# Patient Record
(demographics unavailable — no encounter records)

---

## 2024-10-17 NOTE — ASSESSMENT
[FreeTextEntry1] : Left X-Ray Examination of the HAND (3 views): nondisplaced fracture of 1st MC bone   - The patient was advised of the diagnosis.  The natural history of the pathology was explained to the patient in layman's terms.  Several different treatment options were discussed and explained including the risks and benefits of both surgical and non-surgical treatments.   Surgical risks include but are not limited to pain, infection, bleeding, vascular injury, numbness, tingling, nerve damage. - Due to risks of surgery, they will continue conservative treatment  - nonop fx care in thumb brace - The patient was advised to apply ice (wrapped in a towel or protective covering) to the area daily (20 minutes at a time, 2-4X/day). - The patient was advised to modify their activities. - can cheer but no stunting - fu after 4-5 weeks xray for healing    Medication Discussion: 1) We discussed a comprehensive treatment plan that included possible pharmaceutical management involving the use of prescription strength medications including but not limited to options such as oral Naprosyn 500mg BID, once daily Meloxicam 15 mg, or 500-650 mg Tylenol versus over the counter oral medications in addition to discussing possible topical prescription Pennsaid vs  Voltaren gel. 2) There is a moderate risk of morbidity with further treatment, especially from use of prescription strength medications and possible side effects of these medications which include but are not limited to upset stomach with oral medications, skin reactions to topical medications and GI/cardiac/renal issues with long term use. 3) I recommended that the patient follow-up with their medical physician if there are any significant potential issues with long term medication use such as interactions with current medications or with exacerbation of underlying medical comorbidities. 4) The benefits and risks associated with use of oral and / or topical prescription and over the counter anti-inflammatory medications were discussed with the patient. The patient voiced understanding of the risks including but not limited to bleeding, stroke, kidney dysfunction, heart disease, and were referred to the black box warning label for further information.

## 2024-10-17 NOTE — HISTORY OF PRESENT ILLNESS
[de-identified] : 12 year old female  ( RHD, [employer], [employer] in cheer) Left thumb injury since 10/16/24 when went to try and cacth the flyer when thumb got bent back.  The pain is associated with  swelling Worse with activity and better at rest. Has tried ice, bandage

## 2024-10-17 NOTE — IMAGING
[de-identified] : RIGHT  WRIST and HAND Inspection: swelling thumb Palpation: Tenderness  thumb Range of Motion: pain with ROM Strength: normal Neurological testing: motor exam 5/5 and light touch intact.  Special Testing: pain with v/v/ stress

## 2024-11-20 NOTE — ASSESSMENT
[FreeTextEntry1] :  Left X-Ray Examination of the HAND (3 views): healed fracture of 1st MC   - The patient was advised of the diagnosis.  The natural history of the pathology was explained to the patient in layman's terms.  Several different treatment options were discussed and explained including the risks and benefits of both surgical and non-surgical treatments.   Surgical risks include but are not limited to pain, infection, bleeding, vascular injury, numbness, tingling, nerve damage. - Due to risks of surgery, they will continue conservative treatment with OT, icing, and anti-inflammatory medications. - The patient was provided with a prescription for Hand Therapy. - The patient was advised to let pain guide the gradual advancement of activities.

## 2024-11-20 NOTE — HISTORY OF PRESENT ILLNESS
[de-identified] : 12 year old female  ( RHD, [employer], [employer] in cheer) Left thumb injury since 10/16/24 when went to try and cacth the flyer when thumb got bent back.  The pain is associated with  swelling Worse with activity and better at rest. Has tried ice, bandage  11/21/24 - using brace, mod activiyt, pain imprving

## 2024-11-20 NOTE — IMAGING
[de-identified] :   LEFT WRIST and HAND Inspection: No erythema, No swelling  Palpation: No Tenderness  Range of Motion: Full range of motion. Strength: normal Neurological testing: motor exam 5/5 and light touch intact.  Special Testing:

## 2024-11-20 NOTE — HISTORY OF PRESENT ILLNESS
[de-identified] : 12 year old female  ( RHD, [employer], [employer] in cheer) Left thumb injury since 10/16/24 when went to try and cacth the flyer when thumb got bent back.  The pain is associated with  swelling Worse with activity and better at rest. Has tried ice, bandage  11/21/24 - using brace, mod activiyt, pain imprving

## 2024-11-20 NOTE — IMAGING
[de-identified] :   LEFT WRIST and HAND Inspection: No erythema, No swelling  Palpation: No Tenderness  Range of Motion: Full range of motion. Strength: normal Neurological testing: motor exam 5/5 and light touch intact.  Special Testing:

## 2025-04-30 NOTE — HISTORY OF PRESENT ILLNESS
[de-identified] : 04/30/25: Pt is a 12 y/o female presenting of left knee pain. Pt states she was tumbling during cheer and fell on her knee Monday. She was dancing yesterday which caused the pain to get worse. Hurts when she bends the knee and applying pressure.  [FreeTextEntry1] : left knee

## 2025-04-30 NOTE — IMAGING
[de-identified] :  LEFT KNEE Inspection:  minimal effusion Palpation: distal pole of patella  Knee Range of Motion:  0-135 Strength: 5/5 Quadriceps strength, 5/5 Hamstring strength, 4/5 Hip Abductor strength Neurological: light touch is intact throughout Ligament Stability and Special Tests:  McMurrays: neg Lachman: neg Pivot Shift: neg Posterior Drawer: neg Valgus: neg Varus: neg Patella Apprehension: neg Patella Maltracking: neg

## 2025-05-27 NOTE — HISTORY OF PRESENT ILLNESS
[5] : 5 [de-identified] : 12 y/o female (seen with mother) presents with right ankle pain after falling while doing a cartwheel last night. States she struck her ankle on a piece of furniture. Continues to have pain with ambulation. She has been taking Advil. Participates in competitive cheerleading. [] : no [FreeTextEntry1] : R ankle  [FreeTextEntry5] : Mother in exam room. Fell while doing cartwheel hitting L ankle on a ping pong table last night. Took Advil with relief.

## 2025-05-27 NOTE — PHYSICAL EXAM
[Left] : left foot and ankle [2+] : dorsalis pedis pulse: 2+ [] : mildly antalgic [FreeTextEntry3] : abrasion of the lateral ankle [FreeTextEntry9] : good motion with pain

## 2025-05-27 NOTE — DISCUSSION/SUMMARY
[de-identified] : The patient was advised of the diagnosis. The natural history of the pathology was explained in full to the patient in layman's terms. The risks and benefits of surgical and non-surgical treatment alternatives were explained in full to the patient. All questions were answered.  I explained to the patient that OTC pain medication, ice, elevation, compression and rest would benefit them.  Abrasion cleaned and dressed. Proper wound care discussed.  Patient provided with CAM boot.  The patient is instructed to refrain from all gym/sports activity at this time.   Follow up in 1 week with specialist.

## 2025-05-27 NOTE — DISCUSSION/SUMMARY
[de-identified] : The patient was advised of the diagnosis. The natural history of the pathology was explained in full to the patient in layman's terms. The risks and benefits of surgical and non-surgical treatment alternatives were explained in full to the patient. All questions were answered.  I explained to the patient that OTC pain medication, ice, elevation, compression and rest would benefit them.  Abrasion cleaned and dressed. Proper wound care discussed.  Patient provided with CAM boot.  The patient is instructed to refrain from all gym/sports activity at this time.   Follow up in 1 week with specialist.

## 2025-05-27 NOTE — HISTORY OF PRESENT ILLNESS
[5] : 5 [de-identified] : 14 y/o female (seen with mother) presents with right ankle pain after falling while doing a cartwheel last night. States she struck her ankle on a piece of furniture. Continues to have pain with ambulation. She has been taking Advil. Participates in competitive cheerleading. [] : no [FreeTextEntry1] : R ankle  [FreeTextEntry5] : Mother in exam room. Fell while doing cartwheel hitting L ankle on a ping pong table last night. Took Advil with relief.

## 2025-06-02 NOTE — DATA REVIEWED
[Outside X-rays] : outside x-rays [Left] : left [Ankle] : ankle [I reviewed the films/CD and additionally noted] : I reviewed the films/CD and additionally noted [FreeTextEntry1] : no acute fx

## 2025-06-02 NOTE — HISTORY OF PRESENT ILLNESS
[3] : 3 [1] : 2 [de-identified] : 06/02/2025:  injury doing cartwheel 1.5 weeks ago. struck ankle. went to [facility]. walking in boot. no prior issues. denies pmh. 7th grade -- [employer]. competitive cheer  [] : no [FreeTextEntry1] : left ankle [de-identified] : boot [de-identified] : [facility] [de-identified] : XR

## 2025-06-02 NOTE — PHYSICAL EXAM
[Left] : left foot and ankle [NL (40)] : plantar flexion 40 degrees [NL 30)] : inversion 30 degrees [NL (20)] : eversion 20 degrees [5___] : eversion 5[unfilled]/5 [2+] : dorsalis pedis pulse: 2+ [] : patient ambulates without assistive device [FreeTextEntry8] : min lat heel ttp

## 2025-07-19 NOTE — HISTORY OF PRESENT ILLNESS
[de-identified] : 14 y/o F s/p stubbed toe yesterday now with R small toe pain. She was able to ambulate after event though with pain. Minimal relief with pain medication. denies any other pain or injury. denies numbness/tingling.

## 2025-07-19 NOTE — IMAGING
[de-identified] : PE R 5th toe: +swelling, +tenderness over 5th toe, +ecchymosis, no tenderness at lisfranc area, no tenderness at ankle, EHL/FHL/ADF/APF intact, sensory intact, calves soft, NT  [Right] : right toe [There are no fractures, subluxations or dislocations. No significant abnormalities are seen] : There are no fractures, subluxations or dislocations. No significant abnormalities are seen [Open growth plates] : Open growth plates

## 2025-07-19 NOTE — ASSESSMENT
[FreeTextEntry1] : A/P R 5th toe contusion/sprain - WBAT - HSS applied to patient  - ice/elevation - f/u foot/ankle 1-2 weeks